# Patient Record
Sex: FEMALE | Race: WHITE
[De-identification: names, ages, dates, MRNs, and addresses within clinical notes are randomized per-mention and may not be internally consistent; named-entity substitution may affect disease eponyms.]

---

## 2020-06-07 ENCOUNTER — HOSPITAL ENCOUNTER (EMERGENCY)
Dept: HOSPITAL 95 - ER | Age: 30
Discharge: HOME | End: 2020-06-07
Payer: SELF-PAY

## 2020-06-07 VITALS — HEIGHT: 68 IN | WEIGHT: 184.99 LBS | BODY MASS INDEX: 28.04 KG/M2

## 2020-06-07 DIAGNOSIS — Z88.8: ICD-10-CM

## 2020-06-07 DIAGNOSIS — S93.402A: Primary | ICD-10-CM
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
## 2020-06-07 DIAGNOSIS — F17.200: ICD-10-CM

## 2021-12-01 ENCOUNTER — HOSPITAL ENCOUNTER (OUTPATIENT)
Dept: HOSPITAL 95 - LAB SHORT | Age: 31
Discharge: HOME | End: 2021-12-01
Attending: OBSTETRICS & GYNECOLOGY
Payer: COMMERCIAL

## 2021-12-01 DIAGNOSIS — Z12.4: Primary | ICD-10-CM

## 2021-12-01 PROCEDURE — G0123 SCREEN CERV/VAG THIN LAYER: HCPCS

## 2021-12-02 LAB — OTHER STN SPEC: (no result)

## 2021-12-18 ENCOUNTER — HOSPITAL ENCOUNTER (EMERGENCY)
Dept: HOSPITAL 95 - ER | Age: 31
Discharge: HOME | End: 2021-12-18
Payer: COMMERCIAL

## 2021-12-18 VITALS — HEIGHT: 68 IN | BODY MASS INDEX: 25.01 KG/M2 | WEIGHT: 164.99 LBS

## 2021-12-18 DIAGNOSIS — M54.50: Primary | ICD-10-CM

## 2021-12-18 DIAGNOSIS — F17.210: ICD-10-CM

## 2021-12-18 DIAGNOSIS — Z87.440: ICD-10-CM

## 2021-12-18 PROCEDURE — A9270 NON-COVERED ITEM OR SERVICE: HCPCS

## 2022-01-31 ENCOUNTER — HOSPITAL ENCOUNTER (OUTPATIENT)
Dept: HOSPITAL 95 - ORSCSDS | Age: 32
Discharge: HOME | End: 2022-01-31
Attending: OBSTETRICS & GYNECOLOGY
Payer: COMMERCIAL

## 2022-01-31 VITALS — WEIGHT: 163.58 LBS | HEIGHT: 68 IN | BODY MASS INDEX: 24.79 KG/M2

## 2022-01-31 DIAGNOSIS — Z30.2: Primary | ICD-10-CM

## 2022-01-31 DIAGNOSIS — F17.210: ICD-10-CM

## 2022-01-31 PROCEDURE — 0UT74ZZ RESECTION OF BILATERAL FALLOPIAN TUBES, PERCUTANEOUS ENDOSCOPIC APPROACH: ICD-10-PCS | Performed by: OBSTETRICS & GYNECOLOGY

## 2022-01-31 NOTE — NUR
01/31/22 1421 Velma Escalante
ABDOMINAL AREA PREPPED WITH DURA PREP BY [email or web address]
GERDA AREA & THIGHS PREPPED WITH BETADINE SOLUTION BY [email or web address]
 
BUPIVACAINE 0.5% 30 MLS MIXED WITH EPI 0.15ML PER ORDER TO CONSTITUTE
BUPIVACAINE 0.5% 1:200,000 FOR INJECTION AT OPSITE BY DR BARNEY.

## 2022-11-10 ENCOUNTER — HOSPITAL ENCOUNTER (INPATIENT)
Dept: HOSPITAL 95 - ER | Age: 32
LOS: 1 days | Discharge: HOME | DRG: 419 | End: 2022-11-11
Attending: SURGERY | Admitting: SURGERY
Payer: COMMERCIAL

## 2022-11-10 VITALS — HEIGHT: 68 IN | BODY MASS INDEX: 25.96 KG/M2 | WEIGHT: 171.3 LBS

## 2022-11-10 DIAGNOSIS — K80.10: Primary | ICD-10-CM

## 2022-11-10 DIAGNOSIS — Z79.899: ICD-10-CM

## 2022-11-10 DIAGNOSIS — Z88.1: ICD-10-CM

## 2022-11-10 DIAGNOSIS — Z91.018: ICD-10-CM

## 2022-11-10 DIAGNOSIS — F17.210: ICD-10-CM

## 2022-11-10 DIAGNOSIS — Z98.51: ICD-10-CM

## 2022-11-10 DIAGNOSIS — Z87.440: ICD-10-CM

## 2022-11-10 DIAGNOSIS — Z98.890: ICD-10-CM

## 2022-11-10 DIAGNOSIS — Z88.8: ICD-10-CM

## 2022-11-10 DIAGNOSIS — Z79.51: ICD-10-CM

## 2022-11-10 DIAGNOSIS — J45.909: ICD-10-CM

## 2022-11-10 PROCEDURE — A9270 NON-COVERED ITEM OR SERVICE: HCPCS

## 2022-11-11 LAB
ALBUMIN SERPL BCP-MCNC: 3.7 G/DL (ref 3.4–5)
ALBUMIN/GLOB SERPL: 1.1 {RATIO} (ref 0.8–1.8)
ALT SERPL W P-5'-P-CCNC: 40 U/L (ref 12–78)
ANION GAP SERPL CALCULATED.4IONS-SCNC: 8 MMOL/L (ref 6–16)
AST SERPL W P-5'-P-CCNC: 23 U/L (ref 12–37)
BASOPHILS # BLD AUTO: 0.06 K/MM3 (ref 0–0.23)
BASOPHILS NFR BLD AUTO: 0 % (ref 0–2)
BILIRUB SERPL-MCNC: 0.3 MG/DL (ref 0.1–1)
BUN SERPL-MCNC: 9 MG/DL (ref 8–24)
CALCIUM SERPL-MCNC: 8.9 MG/DL (ref 8.5–10.1)
CHLORIDE SERPL-SCNC: 104 MMOL/L (ref 98–108)
CO2 SERPL-SCNC: 28 MMOL/L (ref 21–32)
CREAT SERPL-MCNC: 0.57 MG/DL (ref 0.4–1)
DEPRECATED RDW RBC AUTO: 40.2 FL (ref 35.1–46.3)
EOSINOPHIL # BLD AUTO: 0.14 K/MM3 (ref 0–0.68)
EOSINOPHIL NFR BLD AUTO: 1 % (ref 0–6)
ERYTHROCYTE [DISTWIDTH] IN BLOOD BY AUTOMATED COUNT: 13 % (ref 11.7–14.2)
GLOBULIN SER CALC-MCNC: 3.5 G/DL (ref 2.2–4)
GLUCOSE SERPL-MCNC: 120 MG/DL (ref 70–99)
HCT VFR BLD AUTO: 42.4 % (ref 33–51)
HGB BLD-MCNC: 14.7 G/DL (ref 11.5–16)
IMM GRANULOCYTES # BLD AUTO: 0.1 K/MM3 (ref 0–0.1)
IMM GRANULOCYTES NFR BLD AUTO: 1 % (ref 0–1)
LYMPHOCYTES # BLD AUTO: 3.91 K/MM3 (ref 0.84–5.2)
LYMPHOCYTES NFR BLD AUTO: 26 % (ref 21–46)
MCHC RBC AUTO-ENTMCNC: 34.7 G/DL (ref 31.5–36.5)
MCV RBC AUTO: 85 FL (ref 80–100)
MONOCYTES # BLD AUTO: 0.84 K/MM3 (ref 0.16–1.47)
MONOCYTES NFR BLD AUTO: 6 % (ref 4–13)
NEUTROPHILS # BLD AUTO: 9.89 K/MM3 (ref 1.96–9.15)
NEUTROPHILS NFR BLD AUTO: 66 % (ref 41–73)
NRBC # BLD AUTO: 0 K/MM3 (ref 0–0.02)
NRBC BLD AUTO-RTO: 0 /100 WBC (ref 0–0.2)
PLATELET # BLD AUTO: 176 K/MM3 (ref 150–400)
POTASSIUM SERPL-SCNC: 4 MMOL/L (ref 3.5–5.5)
PROT SERPL-MCNC: 7.2 G/DL (ref 6.4–8.2)
SODIUM SERPL-SCNC: 140 MMOL/L (ref 136–145)

## 2022-11-11 PROCEDURE — 0FT44ZZ RESECTION OF GALLBLADDER, PERCUTANEOUS ENDOSCOPIC APPROACH: ICD-10-PCS | Performed by: SURGERY

## 2022-11-11 NOTE — NUR
DISCHARGE SUMMARY
PATIENT WENT FOR LAP YUNIOR THIS AFTERNOON WITH DR LARSON. RETURNED TO ROOM AT
1530. POST OP VSS. ALERT AND ORIENTED. AMBULATING TO BATHROOM. VOIDING WELL.
TOLERATING REGULAR DIET AND LIQUIDS. LAP SITES C/D/I. PAIN CONTROLLED WITH PO
PAIN MEDS. IV DC'D WNL. DISCHARGE ORDER GIVEN BY DR LARSON. DISCHARGE
EDUCATION GIVEN ON NEW RX FOR PAIN, INCISION CARE, ACTIVITY, AND FOLLOW UP
APPTS. PATIENT LEFT UNIT AT 1820 VIA WHEELCHAIR WITH FAMILY MEMBER FOR HOME.

## 2022-11-11 NOTE — NUR
SUMMARY
PT ARRIVED TO FLOOR IN NO DSTRESS. PT DENIES N/V. PT HAS BEEN MANAGED WELL. PT
HAS REMAINED NPO. PT HAS BEEN RESTING COMFORTABLY. CALL FOUZIA IN REACH.

## 2022-11-13 ENCOUNTER — HOSPITAL ENCOUNTER (EMERGENCY)
Dept: HOSPITAL 95 - ER | Age: 32
Discharge: HOME | End: 2022-11-13
Payer: COMMERCIAL

## 2022-11-13 VITALS — WEIGHT: 170.99 LBS | BODY MASS INDEX: 25.91 KG/M2 | HEIGHT: 68 IN

## 2022-11-13 DIAGNOSIS — F17.210: ICD-10-CM

## 2022-11-13 DIAGNOSIS — Z88.1: ICD-10-CM

## 2022-11-13 DIAGNOSIS — T78.40XA: Primary | ICD-10-CM

## 2022-11-13 DIAGNOSIS — Z79.899: ICD-10-CM

## 2022-11-13 DIAGNOSIS — Z88.8: ICD-10-CM

## 2022-11-15 ENCOUNTER — HOSPITAL ENCOUNTER (INPATIENT)
Dept: HOSPITAL 95 - ER | Age: 32
LOS: 2 days | Discharge: HOME | DRG: 948 | End: 2022-11-17
Attending: SURGERY | Admitting: SURGERY
Payer: COMMERCIAL

## 2022-11-15 VITALS — BODY MASS INDEX: 19.7 KG/M2 | WEIGHT: 130.01 LBS | HEIGHT: 68 IN

## 2022-11-15 DIAGNOSIS — Z87.440: ICD-10-CM

## 2022-11-15 DIAGNOSIS — R10.11: ICD-10-CM

## 2022-11-15 DIAGNOSIS — Z98.891: ICD-10-CM
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
## 2022-11-15 DIAGNOSIS — F17.210: ICD-10-CM

## 2022-11-15 DIAGNOSIS — Z79.899: ICD-10-CM

## 2022-11-15 DIAGNOSIS — G89.18: Primary | ICD-10-CM

## 2022-11-15 DIAGNOSIS — Z28.21: ICD-10-CM

## 2022-11-15 LAB
ALBUMIN SERPL BCP-MCNC: 3.7 G/DL (ref 3.4–5)
ALBUMIN/GLOB SERPL: 1 {RATIO} (ref 0.8–1.8)
ALT SERPL W P-5'-P-CCNC: 825 U/L (ref 12–78)
ANION GAP SERPL CALCULATED.4IONS-SCNC: 7 MMOL/L (ref 6–16)
AST SERPL W P-5'-P-CCNC: 841 U/L (ref 12–37)
BASOPHILS # BLD AUTO: 0.02 K/MM3 (ref 0–0.23)
BASOPHILS NFR BLD AUTO: 0 % (ref 0–2)
BILIRUB SERPL-MCNC: 1.4 MG/DL (ref 0.1–1)
BUN SERPL-MCNC: 11 MG/DL (ref 8–24)
CALCIUM SERPL-MCNC: 9.3 MG/DL (ref 8.5–10.1)
CHLORIDE SERPL-SCNC: 104 MMOL/L (ref 98–108)
CO2 SERPL-SCNC: 27 MMOL/L (ref 21–32)
CREAT SERPL-MCNC: 0.59 MG/DL (ref 0.4–1)
DEPRECATED RDW RBC AUTO: 41.7 FL (ref 35.1–46.3)
EOSINOPHIL # BLD AUTO: 0.04 K/MM3 (ref 0–0.68)
EOSINOPHIL NFR BLD AUTO: 1 % (ref 0–6)
ERYTHROCYTE [DISTWIDTH] IN BLOOD BY AUTOMATED COUNT: 13.3 % (ref 11.7–14.2)
GLOBULIN SER CALC-MCNC: 3.8 G/DL (ref 2.2–4)
GLUCOSE SERPL-MCNC: 86 MG/DL (ref 70–99)
HCT VFR BLD AUTO: 42.5 % (ref 33–51)
HGB BLD-MCNC: 14.6 G/DL (ref 11.5–16)
IMM GRANULOCYTES # BLD AUTO: 0.08 K/MM3 (ref 0–0.1)
IMM GRANULOCYTES NFR BLD AUTO: 1 % (ref 0–1)
LYMPHOCYTES # BLD AUTO: 2.29 K/MM3 (ref 0.84–5.2)
LYMPHOCYTES NFR BLD AUTO: 28 % (ref 21–46)
MCHC RBC AUTO-ENTMCNC: 34.4 G/DL (ref 31.5–36.5)
MCV RBC AUTO: 86 FL (ref 80–100)
MONOCYTES # BLD AUTO: 0.8 K/MM3 (ref 0.16–1.47)
MONOCYTES NFR BLD AUTO: 10 % (ref 4–13)
NEUTROPHILS # BLD AUTO: 4.83 K/MM3 (ref 1.96–9.15)
NEUTROPHILS NFR BLD AUTO: 60 % (ref 41–73)
NRBC # BLD AUTO: 0 K/MM3 (ref 0–0.02)
NRBC BLD AUTO-RTO: 0 /100 WBC (ref 0–0.2)
PLATELET # BLD AUTO: 244 K/MM3 (ref 150–400)
POTASSIUM SERPL-SCNC: 3.8 MMOL/L (ref 3.5–5.5)
PROT SERPL-MCNC: 7.5 G/DL (ref 6.4–8.2)
SODIUM SERPL-SCNC: 138 MMOL/L (ref 136–145)

## 2022-11-15 PROCEDURE — A9270 NON-COVERED ITEM OR SERVICE: HCPCS

## 2022-11-15 PROCEDURE — A9537 TC99M MEBROFENIN: HCPCS

## 2022-11-15 NOTE — NUR
SHIFT SUMMARY
PT ARRIVED TO THE FLOOR VIA GOURNEY AND REPORTED FEELING PAINFUL IN HER ABD,
ER RN HAD REPORTED GIVING MULTIPLE DOSES OF PAIN MEDICATIONS WHILE SHE WAS
DOWN THERE. PT SETTLED IN ROOM AND PROVIDED PAIN MEDICATIONS AS ORDERED (SEE
EMAR), PT WAS ABLE TO REST FOR A WHILE BEFORE NEEDING MORE PAIN MEDICATIONS.
ADMISSION ASSESSMENT UNREMARKABLE OTHER THAN ABD TENDERNESS AND LAP SITES FROM
PRIOR LAP YUNIOR, STERI STRIPS INTACT. PT TOLERATING CLEAR DIET AS ORDERED.
REPORT GIVEN TO NOC RN.

## 2022-11-16 LAB
ALBUMIN SERPL BCP-MCNC: 3.1 G/DL (ref 3.4–5)
ALBUMIN/GLOB SERPL: 1 {RATIO} (ref 0.8–1.8)
ALT SERPL W P-5'-P-CCNC: 438 U/L (ref 12–78)
ANION GAP SERPL CALCULATED.4IONS-SCNC: 4 MMOL/L (ref 6–16)
AST SERPL W P-5'-P-CCNC: 194 U/L (ref 12–37)
BILIRUB SERPL-MCNC: 1.4 MG/DL (ref 0.1–1)
BUN SERPL-MCNC: 6 MG/DL (ref 8–24)
CALCIUM SERPL-MCNC: 8.4 MG/DL (ref 8.5–10.1)
CHLORIDE SERPL-SCNC: 107 MMOL/L (ref 98–108)
CO2 SERPL-SCNC: 29 MMOL/L (ref 21–32)
CREAT SERPL-MCNC: 0.48 MG/DL (ref 0.4–1)
GLOBULIN SER CALC-MCNC: 3 G/DL (ref 2.2–4)
GLUCOSE SERPL-MCNC: 86 MG/DL (ref 70–99)
POTASSIUM SERPL-SCNC: 3.8 MMOL/L (ref 3.5–5.5)
PROT SERPL-MCNC: 6.1 G/DL (ref 6.4–8.2)
SODIUM SERPL-SCNC: 140 MMOL/L (ref 136–145)

## 2022-11-16 NOTE — NUR
PAIN MANAGEMENT
PT MEDICATED PER EMAR FOR ABD PAIN. EDUCATED ON NONPHARMACOLOGICAL METHODS OF
PAIN MANAGEMENT AND BOWEL CARE. THE PATIENT HAS DENIED ALL METHODS OF PAIN
CONTROL BESIDES MEDICATION AND K PAD USE T/O THE SHIFT. PLAN TO CONTINUE
EDUCATING AND ENCOURAGING THE PATIENT TO USE NONPHARM METHODS ALONGSIDE
MEDICATION USE.

## 2022-11-16 NOTE — NUR
SHIFT SUMMARY
SINCE ASSUMING CARE AROUND 1300, PT HAS BEEN PLEASANT. AMBULATING EVERY HOUR.
PASSING LOTS OF GAS. ADVANCED DIET BUT ONLY TAKING SMALL BITES BUT DRINKING
FLUIDS WELL.

## 2022-11-16 NOTE — NUR
PT BACK FROM HIDA SCAN
ADMINISTERED AM MEDS AND MEDICATED PER ORDERS FOR 7/10 R SIDE ABDOMINAL PAIN.
PT STATES PASSING FLATUS.  RESTARTED IV FLUIDS PER ORDERS.  FAMILY AT BEDSIDE.
CALL LIGHT IN REACH.

## 2022-11-16 NOTE — NUR
POD5 FOR A LAP YUNIOR FROM A PREVIOUS ADMISSION. X3 LAP SITES ARE C/D/I, DRIED
EXUDATE NOTED ON STERI STRIPS. VSS, PT PLACED ON 2L O2 VIA NC FOR SATS
DROPPING DUE TO PAIN MEDICATION. PAIN HAS BEEN DIFFICULT TO CONTROL T/O THE
NIGHT, REGUARLY ROTATING NORCO AND DILAUDID. THE PT HAS UTILIZED THE K PAD T/O
THE NIGHT. EDUCATED T/O THE NIGHT ABOUT BOWEL CARE AND NONPHARMACOLOGICAL PAIN
MANAGEMENT METHODS, THE PT WAS RELUCTANT TO PARTICIPATE DUE TO PAIN LEVEL. PT
VOIDING W/O DIFFICULTY, PASSING FLATTUS, AND TOLLERATING MINIMAL PO INTAKE.
PLAN FOR PT TO HAVE A HIDA SCAN TODAY, CARE WILL BE DICTATED BY THESE
FINDINGS. THE PATIENT IS CURRENTLY SLEEPING, IN NO DISTRESS, CALL LIGHT IN
REACH.

## 2022-11-17 LAB
ALBUMIN SERPL BCP-MCNC: 3.1 G/DL (ref 3.4–5)
ALBUMIN/GLOB SERPL: 0.9 {RATIO} (ref 0.8–1.8)
ALT SERPL W P-5'-P-CCNC: 272 U/L (ref 12–78)
ANION GAP SERPL CALCULATED.4IONS-SCNC: 4 MMOL/L (ref 6–16)
AST SERPL W P-5'-P-CCNC: 58 U/L (ref 12–37)
BILIRUB SERPL-MCNC: 1.1 MG/DL (ref 0.1–1)
BUN SERPL-MCNC: 6 MG/DL (ref 8–24)
CALCIUM SERPL-MCNC: 8.7 MG/DL (ref 8.5–10.1)
CHLORIDE SERPL-SCNC: 105 MMOL/L (ref 98–108)
CO2 SERPL-SCNC: 29 MMOL/L (ref 21–32)
CREAT SERPL-MCNC: 0.5 MG/DL (ref 0.4–1)
GLOBULIN SER CALC-MCNC: 3.4 G/DL (ref 2.2–4)
GLUCOSE SERPL-MCNC: 93 MG/DL (ref 70–99)
POTASSIUM SERPL-SCNC: 3.8 MMOL/L (ref 3.5–5.5)
PROT SERPL-MCNC: 6.5 G/DL (ref 6.4–8.2)
SODIUM SERPL-SCNC: 138 MMOL/L (ref 136–145)

## 2022-11-17 NOTE — NUR
PT HAS BEEN AMBULATING THE HALLS THIS AM, REPORTS HAVING A BM THIS AM, REPORTS
PAIN IS TOLERABLE WITH OXYCODONE, DC INSTRUCTIONS GIVEN, VERBALIZED
UNDERSTANDING, PT DC'D HOME WITH FAMILY.

## 2022-11-17 NOTE — NUR
NIGHT SHIFT SUMMARY
PT IS POD 5 FOR LAP YUNIOR. AAOX4 AND PLEASANT. INDEPENDENT IN ROOM AND GOES
FOR WALKS AT TIMES. PT CONTINUES TO HAVE PAIN TO HER RIGHT SIDE THAT RADIATES
FROM HER HIP TO HER SHOULDER PER PT. NO VISIBLE SKIN BRUISING/DISCOLORATION
NOTED TO R SIDE ON ASSESSMENT. MEDICATING PT WITH IV DILAUDID AND IV TORADOL
PER EMAR. PT HAS BEEN ABLE TO SLEEP FOR AN HOUR OR 2 AT A TIME OFF/ON TONIGHT.
VSS, WILL CONTINUE TO MONITOR.

## 2023-08-10 ENCOUNTER — HOSPITAL ENCOUNTER (OUTPATIENT)
Dept: HOSPITAL 95 - LAB | Age: 33
End: 2023-08-10
Attending: FAMILY MEDICINE
Payer: COMMERCIAL

## 2023-08-10 DIAGNOSIS — R82.79: Primary | ICD-10-CM
